# Patient Record
Sex: MALE | Race: WHITE | URBAN - NONMETROPOLITAN AREA
[De-identification: names, ages, dates, MRNs, and addresses within clinical notes are randomized per-mention and may not be internally consistent; named-entity substitution may affect disease eponyms.]

---

## 2021-11-30 ENCOUNTER — OFFICE VISIT (OUTPATIENT)
Dept: FAMILY MEDICINE CLINIC | Age: 36
End: 2021-11-30
Payer: COMMERCIAL

## 2021-11-30 VITALS
TEMPERATURE: 98.2 F | DIASTOLIC BLOOD PRESSURE: 68 MMHG | OXYGEN SATURATION: 99 % | HEART RATE: 88 BPM | HEIGHT: 73 IN | SYSTOLIC BLOOD PRESSURE: 118 MMHG | WEIGHT: 170.8 LBS | BODY MASS INDEX: 22.64 KG/M2

## 2021-11-30 DIAGNOSIS — L02.212 CUTANEOUS ABSCESS OF BACK (ANY PART, EXCEPT BUTTOCK): Primary | ICD-10-CM

## 2021-11-30 PROCEDURE — 99214 OFFICE O/P EST MOD 30 MIN: CPT | Performed by: INTERNAL MEDICINE

## 2021-11-30 RX ORDER — ACETAMINOPHEN 500 MG
500 TABLET ORAL EVERY 6 HOURS PRN
COMMUNITY

## 2021-11-30 RX ORDER — IBUPROFEN 800 MG/1
800 TABLET ORAL EVERY 6 HOURS PRN
COMMUNITY

## 2021-11-30 SDOH — ECONOMIC STABILITY: FOOD INSECURITY: WITHIN THE PAST 12 MONTHS, YOU WORRIED THAT YOUR FOOD WOULD RUN OUT BEFORE YOU GOT MONEY TO BUY MORE.: NEVER TRUE

## 2021-11-30 SDOH — ECONOMIC STABILITY: FOOD INSECURITY: WITHIN THE PAST 12 MONTHS, THE FOOD YOU BOUGHT JUST DIDN'T LAST AND YOU DIDN'T HAVE MONEY TO GET MORE.: NEVER TRUE

## 2021-11-30 ASSESSMENT — PATIENT HEALTH QUESTIONNAIRE - PHQ9
SUM OF ALL RESPONSES TO PHQ QUESTIONS 1-9: 0
2. FEELING DOWN, DEPRESSED OR HOPELESS: 0
SUM OF ALL RESPONSES TO PHQ QUESTIONS 1-9: 0
SUM OF ALL RESPONSES TO PHQ9 QUESTIONS 1 & 2: 0
SUM OF ALL RESPONSES TO PHQ QUESTIONS 1-9: 0
1. LITTLE INTEREST OR PLEASURE IN DOING THINGS: 0

## 2021-11-30 ASSESSMENT — ENCOUNTER SYMPTOMS
ABDOMINAL PAIN: 0
DIARRHEA: 0
SINUS PAIN: 0
RHINORRHEA: 0
SHORTNESS OF BREATH: 0
COUGH: 0
BLOOD IN STOOL: 0
CHEST TIGHTNESS: 0
TROUBLE SWALLOWING: 0
COLOR CHANGE: 1
SORE THROAT: 0

## 2021-11-30 ASSESSMENT — SOCIAL DETERMINANTS OF HEALTH (SDOH): HOW HARD IS IT FOR YOU TO PAY FOR THE VERY BASICS LIKE FOOD, HOUSING, MEDICAL CARE, AND HEATING?: NOT HARD AT ALL

## 2021-11-30 NOTE — PROGRESS NOTES
[address] [address] [place] [address]  Dept: [phone]  Dept Fax: [phone]  Loc: [phone]     Visit Date:  11/30/2021    Patient:  Malika Sharma  YOB: 1985    HPI:   Malika Sharma presents today for   Chief Complaint   Patient presents with   [facility].     pt states cyst on mid back. he was seen at urgent care sunday for it, but states it grew back in two days. general discomfort    . HPI 39year old male is coming in for more than 2 day history of swelling /cyst noticed in mid thoracic back area which was drained over the weekend at the urgent care. Currently on bactrim, Swelling and pain getting worse. Medications  Prior to Visit Medications    Medication Sig Taking? Authorizing Provider   acetaminophen (TYLENOL) 500 MG tablet Take 500 mg by mouth every 6 hours as needed for Pain Yes Historical Provider, MD   ibuprofen (ADVIL;MOTRIN) 800 MG tablet Take 800 mg by mouth every 6 hours as needed for Pain Yes Historical Provider, MD   Sulfamethoxazole-Trimethoprim (BACTRIM PO) Take by mouth Yes Historical Provider, MD        Allergies:  has No Known Allergies. Past Medical History:   has no past medical history on file. Past Surgical History   has no past surgical history on file. Family History  family history is not on file. Social History   reports that he has never smoked.  He has never used smokeless tobacco.    Health Maintenance:    Health Maintenance   Topic Date Due    Hepatitis C screen  Never done    Varicella vaccine (1 of 2 - 2-dose childhood series) Never done    DTaP/Tdap/Td vaccine (6 - Tdap) 10/29/1999    HIV screen  Never done    Flu vaccine (1) Never done    COVID-19 Vaccine  Completed    Hepatitis A vaccine  Aged Out    Hepatitis B vaccine  Aged Out    Hib vaccine  Aged Out    Meningococcal (ACWY) vaccine  Aged Out    Pneumococcal 0-64 years Vaccine  Aged Out       Subjective:      Review of Systems   Constitutional: Negative for fatigue, fever and unexpected weight change. HENT: Negative for ear pain, postnasal drip, rhinorrhea, sinus pain, sore throat and trouble swallowing. Eyes: Negative for visual disturbance. Respiratory: Negative for cough, chest tightness and shortness of breath. Cardiovascular: Negative for chest pain and leg swelling. Gastrointestinal: Negative for abdominal pain, blood in stool and diarrhea. Endocrine: Negative for polyuria. Genitourinary: Negative for difficulty urinating and flank pain. Musculoskeletal: Negative for arthralgias, joint swelling and myalgias. Skin: Positive for color change. Negative for rash. Swelling/drainage. Allergic/Immunologic: Negative for environmental allergies. Neurological: Negative for weakness, light-headedness, numbness and headaches. Hematological: Negative for adenopathy. Psychiatric/Behavioral: Negative for behavioral problems and suicidal ideas. The patient is not nervous/anxious. Objective:     /68 (Site: Right Upper Arm, Position: Sitting, Cuff Size: Medium Adult)   Pulse 88   Temp 98.2 °F (36.8 °C)   Ht 6' 1\" (1.854 m)   Wt 170 lb 12.8 oz (77.5 kg)   SpO2 99%   BMI 22.53 kg/m²     Physical Exam  Musculoskeletal:         General: Swelling and tenderness present. Arms:       Comments: Fluctuant swelling with purluent discharge noted mid thoracic back area with surrounding erythema/tenderness. Purluent discharge noted. Skin:     Findings: Bruising, erythema, lesion and rash present. Assessment       Diagnosis Orders   1. Cutaneous abscess of back (any part, except buttock)  Ramandeep Julio DO, General Surgery, [place]         PLAN   Infected cyst versus localized abscess for local exploration. General surgery consult appreciated/10 39 AM made for tomorrow.   Continue with bactrim/local/warm compresses. NSAIDs prn for the pain. Orders Placed This Encounter   Procedures   [address] [facility] [place],[address], DO, General Surgery, [place]     Referral Priority:   Urgent     Referral Type:   Eval and Treat     Referral Reason:   Specialty Services Required     Referred to Provider:   Silvano Carey DO     Requested Specialty:   General Surgery     Number of Visits Requested:   1        No follow-ups on file. Patient given educational materials - see patient instructions. Discussed use, benefit, and side effects of prescribed medications. All patient questions answered. Pt voiced understanding. Reviewed health maintenance.        Electronically signed Terrance Otero MD on 11/30/2021 at 3:21 PM EST

## 2021-12-01 ENCOUNTER — OFFICE VISIT (OUTPATIENT)
Dept: SURGERY | Age: 36
End: 2021-12-01
Payer: COMMERCIAL

## 2021-12-01 VITALS
RESPIRATION RATE: 18 BRPM | WEIGHT: 168.2 LBS | BODY MASS INDEX: 22.29 KG/M2 | OXYGEN SATURATION: 98 % | HEART RATE: 84 BPM | DIASTOLIC BLOOD PRESSURE: 100 MMHG | HEIGHT: 73 IN | TEMPERATURE: 97.6 F | SYSTOLIC BLOOD PRESSURE: 135 MMHG

## 2021-12-01 DIAGNOSIS — L02.212 CUTANEOUS ABSCESS OF BACK EXCLUDING BUTTOCKS: Primary | ICD-10-CM

## 2021-12-01 PROCEDURE — 10061 I&D ABSCESS COMP/MULTIPLE: CPT | Performed by: SURGERY

## 2021-12-01 PROCEDURE — 99203 OFFICE O/P NEW LOW 30 MIN: CPT | Performed by: SURGERY

## 2021-12-01 NOTE — PROGRESS NOTES
Pop Chin is a 39 y.o. male who presents today for evaluation of reported cyst of back. The patient is reporting approximately a year ago he noticed a swelling at the mid back just to the right of midline. I states that at this point it looks like a pimple and he actually ruptured it and expressed that fluid himself and had resolution of the swelling. However he states that recently he had a similar lesion in the same area that he again popped and tried to drain himself. Unfortunately soon after that he began to have more significant swelling just to the right of the initial area that was becoming red and swollen and painful. For that reason he was seen by his PCP and was told that he likely had a cutaneous abscess. He was placed on antibiotics and referred to general surgery for further management. Since onset he does report that there has been some drainage of purulent fluid from the area. Has been keeping a dry dressing in place. Patient denies diabetes. Does currently vape but no other sources of nicotine. No past medical history on file. No past surgical history on file. Current Outpatient Medications   Medication Sig Dispense Refill    acetaminophen (TYLENOL) 500 MG tablet Take 500 mg by mouth every 6 hours as needed for Pain      ibuprofen (ADVIL;MOTRIN) 800 MG tablet Take 800 mg by mouth every 6 hours as needed for Pain      Sulfamethoxazole-Trimethoprim (BACTRIM PO) Take by mouth       No current facility-administered medications for this visit. No Known Allergies    No family history on file.     Social History     Socioeconomic History    Marital status: Single     Spouse name: Not on file    Number of children: Not on file    Years of education: Not on file    Highest education level: Not on file   Occupational History    Not on file   Tobacco Use    Smoking status: Never Smoker    Smokeless tobacco: Never Used   Vaping Use    Vaping Use: Every day    Substances: Nicotine    Devices: Refillable tank   Substance and Sexual Activity    Alcohol use: Not on file    Drug use: Not on file    Sexual activity: Not on file   Other Topics Concern    Not on file   Social History Narrative    Not on file     Social Determinants of Health     Financial Resource Strain: Low Risk     Difficulty of Paying Living Expenses: Not hard at all   Food Insecurity: No Food Insecurity    Worried About [address] [facility] in the Last Year: Never true    [address] [facility] [address] in the Last Year: Never true   Transportation Needs:     Lack of Transportation (Medical): Not on file    Lack of Transportation (Non-Medical):  Not on file   Physical Activity:     Days of Exercise per Week: Not on file    Minutes of Exercise per Session: Not on file   Stress:     Feeling of Stress : Not on file   Social Connections:     Frequency of Communication with Friends and Family: Not on file    Frequency of Social Gatherings with Friends and Family: Not on file    Attends Christian Services: Not on file    Active Member of [address] [place] [address] or Organizations: Not on file    Attends Club or Organization Meetings: Not on file    Marital Status: Not on file   Intimate Partner Violence:     Fear of Current or Ex-Partner: Not on file    Emotionally Abused: Not on file    Physically Abused: Not on file    Sexually Abused: Not on file   Housing Stability:     Unable to Pay for Housing in the Last Year: Not on file    Number of [place] in the Last Year: Not on file    Unstable Housing in the Last Year: Not on file       ROS:   Review of Systems - General ROS: negative  Psychological ROS: negative  Ophthalmic ROS: negative  ENT ROS: negative  Respiratory ROS: no cough, shortness of breath, or wheezing  Cardiovascular ROS: no chest pain or dyspnea on exertion  Gastrointestinal ROS: no abdominal pain, change in bowel habits, or black or bloody stools  Genito-Urinary ROS: no dysuria, trouble voiding, or hematuria  Musculoskeletal ROS: negative  Dermatological ROS: Per HPI      Objective   Vitals:    12/01/21 1042   BP: (!) 135/100   Pulse: 84   Resp: 18   Temp: 97.6 °F (36.4 °C)   SpO2: 98%     General:in no apparent distress  Eyes: No gross abnormalities. Ears, Nose, Throat: hearing grossly normal bilaterally  Neck: neck supple and non tender without mass  Lungs: clear to auscultation without wheezes or rales   Heart: S1S2, no mumurs, RRR  Abdomen: soft, nontender, no HSM, no guarding, no rebound, no masses  Extremity: negative  Neuro: CN II-XII grossly intact    Inspection of the right back shows area of induration and fluctuance mid back approximately midclavicular line. This is approximately 7 x 5 cm. In the central portion it does seem that there is opening that almost looks like a small incision and with palpation there is expression of small amount of purulent fluid. Just medial to this area there is a separate small area measures 1.5 cm in diameter. No opening of the skin in this area but some induration in fluctuance present. 1. Cutaneous abscess of back excluding buttocks  Assessment & Plan:  Patient appears to have cutaneous abscess of right back. We will plan for incision and drainage in office today and will place packing gauze which will need to be changed daily for the next 48 hours. Patient will be kept on current antibiotic to completion. Have advised that he will need to wash the area daily with soap and water. Patient does not have any body at home to help him with dressing changes so we will plan for nurse visits over the next 48 hours for packing changes daily.          (Please note that portions of this note were completed with a voice recognition program.  Efforts were made to edit the dictations but occasionally words are mis-transcribed.)      PROCEDURE NOTE    DATE OF PROCEDURE: 12/1/2021     SURGEON: Cherylene Smalls, DO    PREOPERATIVE DIAGNOSIS : Cutaneous abscess of right back    POSTOPERATIVE DIAGNOSIS: Same     OPERATION: Incision and drainage of cutaneous abscesses right back x2    ANESTHESIA: Local with 1% lidocaine with epinephrine    ESTIMATED BLOOD LOSS: Minimal    COMPLICATIONS: None. SPECIMENS: None      HISTORY: The patient is a 39y.o. year old male with history of above preop diagnosis. The risk, benefits, expected outcome, and alternatives to the procedure were explained to the patient's understanding and written informed consent was obtained. OPERATIVE DETAIL:  The patient placed in seated position. Timeout was performed verifying correct patient, position, equipment and procedure to be performed. The area was prepped and draped in the usual sterile fashion. The skin and subcutaneous tissue were infiltrated with 1% lidocaine with epinephrine. Incision was made in transverse fashion for a total length of approximately 2 cm and the pus was extruded from the wound. Cultures were not taken. The wound was explored for loculations, which were lysed. The wounds were then copiously irrigated with saline. The second area medial to the first was also anesthetized and opened and explored in similar fashion. There was expression of purulent material here and what appeared to be a remnant of a cyst capsule which were also excised. Hemostasis was achieved and maintained with direct compression. The wounds were packed with iodoform gauze and dressed with a sterile dressing and sterile dressings were applied. The patient tolerated this procedure well without complication. All sponge and needle counts were correct at the end of the case.     Electronically signed by Jameel Singh DO on 12/1/2021 at 11:46 AM

## 2021-12-15 ENCOUNTER — OFFICE VISIT (OUTPATIENT)
Dept: SURGERY | Age: 36
End: 2021-12-15
Payer: COMMERCIAL

## 2021-12-15 VITALS
BODY MASS INDEX: 21.74 KG/M2 | OXYGEN SATURATION: 99 % | HEART RATE: 84 BPM | WEIGHT: 164 LBS | DIASTOLIC BLOOD PRESSURE: 75 MMHG | HEIGHT: 73 IN | TEMPERATURE: 97.8 F | RESPIRATION RATE: 18 BRPM | SYSTOLIC BLOOD PRESSURE: 119 MMHG

## 2021-12-15 DIAGNOSIS — L02.212 CUTANEOUS ABSCESS OF BACK EXCLUDING BUTTOCKS: Primary | ICD-10-CM

## 2021-12-15 PROCEDURE — 99213 OFFICE O/P EST LOW 20 MIN: CPT | Performed by: SURGERY

## 2021-12-15 NOTE — ASSESSMENT & PLAN NOTE
Patient seems to be healing well. I have advised that he keep a dry dressing on the wound until it has completely healed over. Continue to wash with soap and water daily. I expect that this will heal up in the next week. Patient is advised to follow-up as needed. If he is still having ongoing drainage or any new problems past 1week he should call the office and we will get him back in for recheck. Otherwise may follow-up with PCP.

## 2021-12-15 NOTE — PROGRESS NOTES
Subjective   Jil Osgood is a 39 y.o. male who presents today for follow-up of incision and drainage of cutaneous abscess of back. Procedure was done 2 weeks ago. He comes in today for recheck. Since last being seen he states that the pain has almost completely resolved. Denies any ongoing redness or irritation of the tissue. Does report some minimal drainage still from the lateral I&D site but this seems to be improving and is minimal.  No other issues or concerns today. No past medical history on file. No past surgical history on file. Current Outpatient Medications   Medication Sig Dispense Refill    acetaminophen (TYLENOL) 500 MG tablet Take 500 mg by mouth every 6 hours as needed for Pain      ibuprofen (ADVIL;MOTRIN) 800 MG tablet Take 800 mg by mouth every 6 hours as needed for Pain      Sulfamethoxazole-Trimethoprim (BACTRIM PO) Take by mouth (Patient not taking: Reported on 12/15/2021)       No current facility-administered medications for this visit. No Known Allergies    No family history on file.     Social History     Socioeconomic History    Marital status: Single     Spouse name: Not on file    Number of children: Not on file    Years of education: Not on file    Highest education level: Not on file   Occupational History    Not on file   Tobacco Use    Smoking status: Never Smoker    Smokeless tobacco: Never Used   Vaping Use    Vaping Use: Every day    Substances: Nicotine    Devices: Refillable tank   Substance and Sexual Activity    Alcohol use: Not on file    Drug use: Not on file    Sexual activity: Not on file   Other Topics Concern    Not on file   Social History Narrative    Not on file     Social Determinants of Health     Financial Resource Strain: Low Risk     Difficulty of Paying Living Expenses: Not hard at all   Food Insecurity: No Food Insecurity    Worried About [address] [employer] in the Last Year: Never true    [facility] in the Last Year: Never true   Transportation Needs:     Lack of Transportation (Medical): Not on file    Lack of Transportation (Non-Medical): Not on file   Physical Activity:     Days of Exercise per Week: Not on file    Minutes of Exercise per Session: Not on file   Stress:     Feeling of Stress : Not on file   Social Connections:     Frequency of Communication with Friends and Family: Not on file    Frequency of Social Gatherings with Friends and Family: Not on file    Attends [employer] Services: Not on file    Active Member of [address] [place] [address] or Organizations: Not on file    Attends Club or Organization Meetings: Not on file    Marital Status: Not on file   Intimate Partner Violence:     Fear of Current or Ex-Partner: Not on file    Emotionally Abused: Not on file    Physically Abused: Not on file    Sexually Abused: Not on file   Housing Stability:     Unable to Pay for Housing in the Last Year: Not on file    Number of [place] in the Last Year: Not on file    Unstable Housing in the Last Year: Not on file       ROS:   Review of Systems - Negative except as noted in HPI      Objective   Vitals:    12/15/21 0942   BP: 119/75   Pulse: 84   Resp: 18   Temp: 97.8 °F (36.6 °C)   SpO2: 99%     General:in no apparent distress and well developed and well nourished  Eyes: No gross abnormalities. Ears, Nose, Throat: hearing grossly normal bilaterally  Neck: neck supple and non tender without mass  Lungs: clear to auscultation without wheezes or rales   Heart: S1S2, no mumurs, RRR  Abdomen: soft, nontender, no HSM, no guarding, no rebound, no masses  Extremity: negative  Neuro: CN II-XII grossly intact    I&D sites at right upper back both appear clean. The medial incision has actually healed and there is no fluctuance, induration or erythema noted. The lateral incision is still open but has certainly filled in and only probes to a depth of approximately 3 mm. No surrounding erythema and no fluctuance noted. Minimal amount of induration immediately around the incision consistent with healing. Granulation tissue present in wound base. 1. Cutaneous abscess of back excluding buttocks  Assessment & Plan:  Patient seems to be healing well. I have advised that he keep a dry dressing on the wound until it has completely healed over. Continue to wash with soap and water daily. I expect that this will heal up in the next week. Patient is advised to follow-up as needed. If he is still having ongoing drainage or any new problems past 1week he should call the office and we will get him back in for recheck. Otherwise may follow-up with PCP.          (Please note that portions of this note were completed with a voice recognition program.  Efforts were made to edit the dictations but occasionally words are mis-transcribed.)